# Patient Record
Sex: FEMALE | Race: AMERICAN INDIAN OR ALASKA NATIVE
[De-identification: names, ages, dates, MRNs, and addresses within clinical notes are randomized per-mention and may not be internally consistent; named-entity substitution may affect disease eponyms.]

---

## 2018-04-02 ENCOUNTER — HOSPITAL ENCOUNTER (OUTPATIENT)
Dept: HOSPITAL 5 - SPVIMAG | Age: 58
Discharge: HOME | End: 2018-04-02
Attending: ORTHOPAEDIC SURGERY
Payer: COMMERCIAL

## 2018-04-02 DIAGNOSIS — M76.892: ICD-10-CM

## 2018-04-02 DIAGNOSIS — M76.891: ICD-10-CM

## 2018-04-02 DIAGNOSIS — M17.0: Primary | ICD-10-CM

## 2018-04-03 NOTE — XRAY REPORT
XRAY BILATERAL KNEE THREE VIEWS EACH: 04/02/18 12:29:00



CLINICAL: Bilateral knee pain.



FINDINGS: Note that PA rather than AP standing views were performed and 

I have reversed the left right orientation for viewing.



Right:  No fracture or dislocation.  The medial and lateral joint 

spaces are normal.  Tiny patellofemoral osteophytes.  A quadriceps 

insertion enthesophyte.  No joint effusion. Normal soft tissues.



Left:   No fracture or dislocation.  Mild medial joint space narrowing 

with early osteophyte formation.  A small anterior patellofemoral 

osteophyte.  A small quadriceps insertion enthesophyte.  No joint 

effusion.Normal soft tissues.







IMPRESSION: Very mild bilateral osteoarthritis.  Bilateral quadriceps 

enthesopathy.